# Patient Record
Sex: MALE | Race: WHITE | NOT HISPANIC OR LATINO | Employment: UNEMPLOYED | URBAN - METROPOLITAN AREA
[De-identification: names, ages, dates, MRNs, and addresses within clinical notes are randomized per-mention and may not be internally consistent; named-entity substitution may affect disease eponyms.]

---

## 2023-05-22 ENCOUNTER — HOSPITAL ENCOUNTER (EMERGENCY)
Facility: OTHER | Age: 23
Discharge: HOME | End: 2023-05-22
Attending: EMERGENCY MEDICINE
Payer: MEDICAID

## 2023-05-22 VITALS
HEIGHT: 76 IN | DIASTOLIC BLOOD PRESSURE: 77 MMHG | WEIGHT: 266.75 LBS | SYSTOLIC BLOOD PRESSURE: 128 MMHG | TEMPERATURE: 98 F | RESPIRATION RATE: 18 BRPM | BODY MASS INDEX: 32.48 KG/M2 | HEART RATE: 84 BPM | OXYGEN SATURATION: 100 %

## 2023-05-22 DIAGNOSIS — K59.00 CONSTIPATION: ICD-10-CM

## 2023-05-22 PROCEDURE — 63600175 PHARM REV CODE 636 W HCPCS: Performed by: PHYSICIAN ASSISTANT

## 2023-05-22 PROCEDURE — 99284 EMERGENCY DEPT VISIT MOD MDM: CPT

## 2023-05-22 PROCEDURE — 96372 THER/PROPH/DIAG INJ SC/IM: CPT | Performed by: PHYSICIAN ASSISTANT

## 2023-05-22 RX ADMIN — METHYLNALTREXONE BROMIDE 12 MG: 12 INJECTION, SOLUTION SUBCUTANEOUS at 12:05

## 2023-05-22 NOTE — ED PROVIDER NOTES
"Encounter Date: 5/22/2023       History     Chief Complaint   Patient presents with    Constipation     States he hasn't had a BM in 2 weeks. Pt has been taking lactulose at [facility], states it doesn't help "it just makes me gassy". Hx of heroin use. Pt reports abd discomfort.      Afebrile 23-year-old male with past medical history of IV drug use which includes heroin and continued Suboxone use presents the ED for evaluation of constipation.  Patient reports no significant bowel movement x2 weeks.  He does report he is passing gas.  Currently at [facility] and receiving lactulose.  He states he has no improvement in this.  He does report some remote generalized abdominal cramping however denies any focal pain today.  Denies any fever chills.  States some nausea however denies any vomiting.  Denies any previous abdominal surgeries.    The history is provided by the patient.   Review of patient's allergies indicates:  No Known Allergies  History reviewed. No pertinent past medical history.  History reviewed. No pertinent surgical history.  History reviewed. No pertinent family history.     Review of Systems  See HPI  Physical Exam     Initial Vitals [05/22/23 1044]   BP Pulse Resp Temp SpO2   132/78 98 18 97.8 °F (36.6 °C) 99 %      MAP       --         Physical Exam    Nursing note and vitals reviewed.  Constitutional: Vital signs are normal. He appears well-developed and well-nourished. He is cooperative.  Non-toxic appearance. He does not appear ill. No distress.   HENT:   Head: Normocephalic and atraumatic.   Eyes: Conjunctivae and lids are normal.   Neck: Trachea normal. Neck supple. No stridor present. No tracheal deviation present.   Normal range of motion.  Cardiovascular:  Normal rate.           Pulmonary/Chest: No respiratory distress.   Abdominal: Abdomen is soft. There is no abdominal tenderness. There is no rebound and no guarding.   Musculoskeletal:      Cervical back: Normal range of motion and " neck supple.     Neurological: He is alert and oriented to person, place, and time. GCS eye subscore is 4. GCS verbal subscore is 5. GCS motor subscore is 6.   Skin: Skin is warm, dry and intact. No rash noted.   Psychiatric: He has a normal mood and affect. His speech is normal and behavior is normal. Thought content normal.       ED Course   Procedures  Labs Reviewed - No data to display       Imaging Results              X-Ray Abdomen Flat And Erect (Final result)  Result time 05/22/23 11:24:41      Final result by Oscar Haskins MD (05/22/23 11:24:41)                   Impression:      No acute process identified.    Further workup as warranted clinically.      Electronically signed by: Oscar Haskins MD  Date:    05/22/2023  Time:    11:24               Narrative:    EXAMINATION:  XR ABDOMEN FLAT AND ERECT    CLINICAL HISTORY:  Constipation, unspecified    TECHNIQUE:  Flat and erect AP radiographs of the abdomen were performed.    COMPARISON:  None    FINDINGS:  Bowel gas pattern appears within normal limits.  No dilated loops of bowel or air-fluid levels identified.  No free air.    No evidence of organomegaly or intra-abdominal mass effect.    No renal or ureteral calculi identified.                                       Medications   methylnaltrexone 12 mg/0.6 mL subcutaneous injection 12 mg (12 mg Subcutaneous Given 5/22/23 1224)     Medical Decision Making:   History:   Old Medical Records: I decided to obtain old medical records.  Initial Assessment:   Well-appearing male presenting with constipation x2 weeks.  Passing gas and small bowel movements however no significant evacuation.  Recent history significant for opiate use.  He appears nontoxic in no distress.  Abdomen is soft and nontender.  Decreased bowel sounds.  Remaining exam unremarkable.  Differential Diagnosis:   Constipation, obstruction, ileus, fecal impaction, tender denies abdominal cramping, slow transit  Clinical Tests:   Radiological  Study: Reviewed and Ordered  ED Management:  Low suspicion of obstruction given overall appearance however as he is not had significant bowel movement 2 weeks will obtain flat and erect x-ray.  X-ray with no obstruction or dilated loops of bowel visualized.  As the etiology is from opiate use released door was ordered.  Had no bowel movement after this and hence was offered enema.  Patient had significant evacuation in the emergency room.  Encouraged to continue lactulose until soft stools.  He may need to continue this and or stool softener as he is currently on Suboxone therapy. Strict instructions to follow up with primary care physician or reference provided for further assessment and evaluation. Given instructions to return for any acute symptoms and verbalized understanding of this medical plan.                     Clinical Impression:   Final diagnoses:  [K59.00] Constipation        ED Disposition Condition    Discharge Stable          ED Prescriptions    None       Follow-up Information       Follow up With Specialties Details Why Contact Info    [facility]  Schedule an appointment as soon as possible for a visit   [address] [place] [phone]             ANNA Pacheco  05/24/23 0983

## 2023-05-22 NOTE — FIRST PROVIDER EVALUATION
Medical screening examination initiated.  I have conducted a focused provider triage encounter, findings are as follows:    Brief history of present illness:presents from [facility] with constipation x 2 weeks. Passing gas.     Vitals:    05/22/23 1044   Temp: 97.8 °F (36.6 °C)   TempSrc: Oral       Pertinent physical exam:  well appearing in no distress    Brief workup plan:  x-ray, Relistor ordered due to opioid induced constipation and enema    Preliminary workup initiated; this workup will be continued and followed by the physician or advanced practice provider that is assigned to the patient when roomed.

## 2023-05-22 NOTE — ED TRIAGE NOTES
Pt arrived with c/o constipation x 2 weeks.  Pt states he tried taking lactulose and stool softener with no relief.  Pt states he vomited a few times over the past weeks.  Pt endorses abd pain and gassiness.  Pt states he detoxed from heroin recently, is staying at [facility].  Pt answering questions appropriately, speaking in complete sentences, respirations even and unlabored.  Aao x 4.